# Patient Record
Sex: FEMALE | Race: BLACK OR AFRICAN AMERICAN | NOT HISPANIC OR LATINO
[De-identification: names, ages, dates, MRNs, and addresses within clinical notes are randomized per-mention and may not be internally consistent; named-entity substitution may affect disease eponyms.]

---

## 2020-04-26 ENCOUNTER — MESSAGE (OUTPATIENT)
Age: 60
End: 2020-04-26

## 2020-05-03 LAB
SARS-COV-2 IGG SERPL IA-ACNC: <0.1 INDEX
SARS-COV-2 IGG SERPL QL IA: NEGATIVE

## 2020-05-06 ENCOUNTER — APPOINTMENT (OUTPATIENT)
Dept: DISASTER EMERGENCY | Facility: HOSPITAL | Age: 60
End: 2020-05-06

## 2023-09-28 ENCOUNTER — APPOINTMENT (OUTPATIENT)
Dept: ORTHOPEDIC SURGERY | Facility: CLINIC | Age: 63
End: 2023-09-28
Payer: COMMERCIAL

## 2023-09-28 VITALS — BODY MASS INDEX: 38.65 KG/M2 | HEIGHT: 70 IN | WEIGHT: 270 LBS

## 2023-09-28 DIAGNOSIS — I10 ESSENTIAL (PRIMARY) HYPERTENSION: ICD-10-CM

## 2023-09-28 PROBLEM — Z00.00 ENCOUNTER FOR PREVENTIVE HEALTH EXAMINATION: Status: ACTIVE | Noted: 2023-09-28

## 2023-09-28 PROCEDURE — 73564 X-RAY EXAM KNEE 4 OR MORE: CPT | Mod: 50

## 2023-09-28 PROCEDURE — 99213 OFFICE O/P EST LOW 20 MIN: CPT

## 2023-09-29 RX ORDER — HYALURONATE SODIUM 20 MG/2 ML
20 SYRINGE (ML) INTRAARTICULAR
Qty: 2 | Refills: 0 | Status: ACTIVE | COMMUNITY
Start: 2023-09-29 | End: 1900-01-01

## 2023-10-17 ENCOUNTER — APPOINTMENT (OUTPATIENT)
Dept: ORTHOPEDIC SURGERY | Facility: CLINIC | Age: 63
End: 2023-10-17
Payer: COMMERCIAL

## 2023-10-17 PROCEDURE — 20610 DRAIN/INJ JOINT/BURSA W/O US: CPT | Mod: 50

## 2023-10-17 PROCEDURE — 99213 OFFICE O/P EST LOW 20 MIN: CPT | Mod: 25

## 2023-10-26 ENCOUNTER — APPOINTMENT (OUTPATIENT)
Dept: ORTHOPEDIC SURGERY | Facility: CLINIC | Age: 63
End: 2023-10-26
Payer: COMMERCIAL

## 2023-10-26 PROCEDURE — 99212 OFFICE O/P EST SF 10 MIN: CPT | Mod: 25

## 2023-10-26 PROCEDURE — 20610 DRAIN/INJ JOINT/BURSA W/O US: CPT | Mod: 50

## 2023-11-02 ENCOUNTER — APPOINTMENT (OUTPATIENT)
Dept: ORTHOPEDIC SURGERY | Facility: CLINIC | Age: 63
End: 2023-11-02
Payer: COMMERCIAL

## 2023-11-02 PROCEDURE — 20610 DRAIN/INJ JOINT/BURSA W/O US: CPT | Mod: 50

## 2023-11-02 PROCEDURE — 99212 OFFICE O/P EST SF 10 MIN: CPT | Mod: 25

## 2024-07-02 ENCOUNTER — APPOINTMENT (OUTPATIENT)
Dept: ORTHOPEDIC SURGERY | Facility: CLINIC | Age: 64
End: 2024-07-02
Payer: COMMERCIAL

## 2024-07-02 VITALS — WEIGHT: 280 LBS | HEIGHT: 70 IN | BODY MASS INDEX: 40.09 KG/M2

## 2024-07-02 PROBLEM — M17.12 PRIMARY OSTEOARTHRITIS OF LEFT KNEE: Status: ACTIVE | Noted: 2023-09-28

## 2024-07-02 PROBLEM — M17.11 PRIMARY OSTEOARTHRITIS OF RIGHT KNEE: Status: ACTIVE | Noted: 2023-09-28

## 2024-07-02 PROCEDURE — 99214 OFFICE O/P EST MOD 30 MIN: CPT

## 2024-07-02 RX ORDER — CELECOXIB 200 MG/1
200 CAPSULE ORAL DAILY
Qty: 30 | Refills: 0 | Status: ACTIVE | COMMUNITY
Start: 2024-07-02 | End: 1900-01-01

## 2024-07-03 RX ORDER — HYALURONATE SODIUM 20 MG/2 ML
20 SYRINGE (ML) INTRAARTICULAR
Qty: 2 | Refills: 0 | Status: ACTIVE | COMMUNITY
Start: 2024-07-03 | End: 1900-01-01

## 2024-07-18 ENCOUNTER — APPOINTMENT (OUTPATIENT)
Dept: ORTHOPEDIC SURGERY | Facility: CLINIC | Age: 64
End: 2024-07-18
Payer: COMMERCIAL

## 2024-07-18 PROCEDURE — 20610 DRAIN/INJ JOINT/BURSA W/O US: CPT | Mod: 50

## 2024-07-25 ENCOUNTER — APPOINTMENT (OUTPATIENT)
Dept: ORTHOPEDIC SURGERY | Facility: CLINIC | Age: 64
End: 2024-07-25
Payer: COMMERCIAL

## 2024-07-25 PROCEDURE — 20610 DRAIN/INJ JOINT/BURSA W/O US: CPT | Mod: 50

## 2024-07-25 NOTE — PROCEDURE
[FreeTextEntry3] :  Viscosupplementation Injection: X-ray evidence of osteoarthritis on this or prior visit and patient has tried OTC's including aspirin, Ibuprofen, Aleve etc or prescription NSAIDS, and/or exercises at home and/ or physical therapy without satisfactory response.  An injection of Euflexxa 2.5ml #2 was injected into the bilateral knees after verbal consent obtained.  Patient has tried OTC's including aspirin, Ibuprofen, Aleve etc or prescription NSAIDS, and/or exercises at home and/ or physical therapy without satisfactory response and Patient has decreased mobility in the joint. The risks, benefits, and alternatives to cortisone injection were explained in full to the patient. Risks outlined include but are not limited to infection, sepsis, bleeding, scarring, skin discoloration, temporary increase in pain, syncopal episode, failure to resolve symptoms, allergic reaction, and symptom recurrence. Patient understands the risks. All questions were answered. After discussion of options, patient requested an injection. Oral informed consent was obtained. Sterile technique was utilized for the procedure including the preparation of the solutions used for the injection. Injection site was prepped with betadine and alcohol. Ethyl chloride sprayed topically. Sterile technique used. Patient tolerated procedure well.  Post Procedure Instructions: Patient was advised to call if redness, pain, or fever occur. Apply ice for 15 min. every 2 hours for the next 12-24 hours as tolerated. Patient was advised to rest the joint(s) for 1-2 days.

## 2024-07-25 NOTE — HISTORY OF PRESENT ILLNESS
[Gradual] : gradual [Result of repetitive motion] : result of repetitive motion [8] : 8 [2] : 2 [Dull/Aching] : dull/aching [Household chores] : household chores [Leisure] : leisure [Work] : work [Social interactions] : social interactions [Nothing helps with pain getting better] : Nothing helps with pain getting better [Walking] : walking [Stairs] : stairs [3] : 3 [Euflexxa] : Euflexxa [de-identified] : 07/25/2024: B/L knee Euflexxa #2.  Symptoms continue, denies complications with prior injections.  07/18/2024: B/L knee Euflexxa #1.  Symptoms continue, denies complications with prior injections.  07/02/2024: Follow up bilateral knees. Symptoms persist. She notes minimal relief with Euflexxa injections.   11/02/2023: B/L knee Euflexxa #3. Symptoms continue. Denies complication with prior injection.  10/26/2023: B/L Euflexxa #2. Symptoms continue. Denies complication with prior injection.  10/17/2023: Follow up B/L knees. She has been approved for Euflexxa injections.   09/28/2023: 62 y/o female presents with chronic bilateral knee pain. She did have good relief with Euflexxa injections in 2019. [] : Post Surgical Visit: no [FreeTextEntry1] :  Knee [FreeTextEntry5] : pain a lot in the left , last Euflexxa 11/2/23  [de-identified] : b/l knee

## 2024-07-25 NOTE — ASSESSMENT
[FreeTextEntry1] : Underlying pathology and treatment options reviewed. 09/28/2023 Xrays of the B/L knees, 4 views, reveals adv global OA. Euflexxa auth submitted. Activity modification as tolerated. Questions addressed. Follow up when approved.  10/17/2023: B/L knee Euflexxa #1 tolerated well. Ice if sore. RTO 1 week to continue.  10/26/2023: B/L knee Euflexxa #2 tolerated well. Ice if sore. RTO 1 week to continue.  11/02/2023: B/L knee Euflexxa #3 tolerated well. Ice if sore. Follow up in 6 weeks if symptoms persist.   07/02/2024: Treatment options reviewed. Patient is not ready for TKA at this time due to familial responsibilities. Will request auth for Orthovisc injections both knees. Rx for Celebrex - proper use reviewed. Discussed activity modifications. Return when visco approved. Questions answered.  07/18/2024: B/L knee Euflexxa #1 tolerated well.   Ice if sore. RTO in 1 week to continue.  07/25/2024: B/L knee Euflexxa #2 tolerated well.   Ice if sore. RTO in 1 week to continue.  Progress note completed by Lili Thomas PA-C under the direct supervision of Darryl Ribeiro MD.

## 2024-07-25 NOTE — PHYSICAL EXAM
[Bilateral] : knee bilaterally [] : no atrophy [TWNoteComboBox7] : flexion 120 degrees [de-identified] : extension 5 degrees

## 2024-08-01 ENCOUNTER — APPOINTMENT (OUTPATIENT)
Dept: ORTHOPEDIC SURGERY | Facility: CLINIC | Age: 64
End: 2024-08-01
Payer: COMMERCIAL

## 2024-08-01 DIAGNOSIS — M17.11 UNILATERAL PRIMARY OSTEOARTHRITIS, RIGHT KNEE: ICD-10-CM

## 2024-08-01 DIAGNOSIS — M17.12 UNILATERAL PRIMARY OSTEOARTHRITIS, LEFT KNEE: ICD-10-CM

## 2024-08-01 PROCEDURE — 20610 DRAIN/INJ JOINT/BURSA W/O US: CPT | Mod: 50

## 2024-08-01 NOTE — PHYSICAL EXAM
[Bilateral] : knee bilaterally [] : no atrophy [TWNoteComboBox7] : flexion 120 degrees [de-identified] : extension 5 degrees

## 2024-08-01 NOTE — HISTORY OF PRESENT ILLNESS
[Gradual] : gradual [Result of repetitive motion] : result of repetitive motion [8] : 8 [2] : 2 [Dull/Aching] : dull/aching [Household chores] : household chores [Leisure] : leisure [Work] : work [Social interactions] : social interactions [Nothing helps with pain getting better] : Nothing helps with pain getting better [Walking] : walking [Stairs] : stairs [3] : 3 [Euflexxa] : Euflexxa [de-identified] : 08/01/2024: B/L knee Euflexxa #3.  Symptoms continue, denies complications with prior injections.  07/25/2024: B/L knee Euflexxa #2.  Symptoms continue, denies complications with prior injections.  07/18/2024: B/L knee Euflexxa #1.  Symptoms continue, denies complications with prior injections.  07/02/2024: Follow up bilateral knees. Symptoms persist. She notes minimal relief with Euflexxa injections.   11/02/2023: B/L knee Euflexxa #3. Symptoms continue. Denies complication with prior injection.  10/26/2023: B/L Euflexxa #2. Symptoms continue. Denies complication with prior injection.  10/17/2023: Follow up B/L knees. She has been approved for Euflexxa injections.   09/28/2023: 62 y/o female presents with chronic bilateral knee pain. She did have good relief with Euflexxa injections in 2019. [] : Post Surgical Visit: no [FreeTextEntry1] :  Knee [FreeTextEntry5] : pain a lot in the left , last Euflexxa 11/2/23  [de-identified] : b/l knee

## 2024-08-01 NOTE — PROCEDURE
[FreeTextEntry3] :  Viscosupplementation Injection: X-ray evidence of osteoarthritis on this or prior visit and patient has tried OTC's including aspirin, Ibuprofen, Aleve etc or prescription NSAIDS, and/or exercises at home and/ or physical therapy without satisfactory response.  An injection of Euflexxa 2.5ml #3 was injected into the bilateral knees after verbal consent obtained.  Patient has tried OTC's including aspirin, Ibuprofen, Aleve etc or prescription NSAIDS, and/or exercises at home and/ or physical therapy without satisfactory response and Patient has decreased mobility in the joint. The risks, benefits, and alternatives to cortisone injection were explained in full to the patient. Risks outlined include but are not limited to infection, sepsis, bleeding, scarring, skin discoloration, temporary increase in pain, syncopal episode, failure to resolve symptoms, allergic reaction, and symptom recurrence. Patient understands the risks. All questions were answered. After discussion of options, patient requested an injection. Oral informed consent was obtained. Sterile technique was utilized for the procedure including the preparation of the solutions used for the injection. Injection site was prepped with betadine and alcohol. Ethyl chloride sprayed topically. Sterile technique used. Patient tolerated procedure well.  Post Procedure Instructions: Patient was advised to call if redness, pain, or fever occur. Apply ice for 15 min. every 2 hours for the next 12-24 hours as tolerated. Patient was advised to rest the joint(s) for 1-2 days.